# Patient Record
Sex: MALE | Race: WHITE | URBAN - METROPOLITAN AREA
[De-identification: names, ages, dates, MRNs, and addresses within clinical notes are randomized per-mention and may not be internally consistent; named-entity substitution may affect disease eponyms.]

---

## 2021-03-22 ENCOUNTER — OFFICE VISIT (OUTPATIENT)
Dept: URBAN - METROPOLITAN AREA CLINIC 13 | Facility: CLINIC | Age: 71
End: 2021-03-22
Payer: COMMERCIAL

## 2021-03-22 DIAGNOSIS — H35.81 RETINAL EDEMA: ICD-10-CM

## 2021-03-22 PROCEDURE — 99214 OFFICE O/P EST MOD 30 MIN: CPT | Performed by: OPHTHALMOLOGY

## 2021-03-22 PROCEDURE — 92134 CPTRZ OPH DX IMG PST SGM RTA: CPT | Performed by: OPHTHALMOLOGY

## 2021-03-22 ASSESSMENT — INTRAOCULAR PRESSURE
OD: 17
OS: 14

## 2021-03-22 NOTE — IMPRESSION/PLAN
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 OD.
OCT OU: resolved  OD, No SRF/ OS 
- h/o CME s/p CE/PCIOL ~2008 -> s/p PSTK x 1 -> resolution
- new episode of CME OD 7/31/2018 --> recurrence 8/26/19 Plan: H/o recurrence of iritis and CME off drops. Now remains resolved on daily PF.

RBAC's of complete taper vs CSM d/w patient. He elects to cont maintenance drops IOP OD a bit higher than OS, however remains WNL. Will monitor. May need IOP gtt in future. 

3-6m OCT OU

## 2021-03-22 NOTE — IMPRESSION/PLAN
Impression: Unspecified retinal break, left eye: H33.302.
s/p laser retinopexy Plan: No RD/RT. Stable-monitor RD warning instructions

## 2021-10-25 ENCOUNTER — OFFICE VISIT (OUTPATIENT)
Dept: URBAN - METROPOLITAN AREA CLINIC 13 | Facility: CLINIC | Age: 71
End: 2021-10-25
Payer: COMMERCIAL

## 2021-10-25 DIAGNOSIS — H33.302 UNSPECIFIED RETINAL BREAK, LEFT EYE: ICD-10-CM

## 2021-10-25 DIAGNOSIS — H30.91 UNSPECIFIED CHORIORETINAL INFLAMMATION, RIGHT EYE: Primary | ICD-10-CM

## 2021-10-25 PROCEDURE — 99214 OFFICE O/P EST MOD 30 MIN: CPT | Performed by: OPHTHALMOLOGY

## 2021-10-25 PROCEDURE — 92134 CPTRZ OPH DX IMG PST SGM RTA: CPT | Performed by: OPHTHALMOLOGY

## 2021-10-25 ASSESSMENT — INTRAOCULAR PRESSURE
OS: 8
OD: 14

## 2021-10-25 NOTE — IMPRESSION/PLAN
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 OD.
OCT OU: resolved IRF OD, No SRF/IRF OS  / 276 
- h/o CME s/p CE/PCIOL ~2008 -> s/p PSTK x 1 -> resolution
- new episode of CME OD 7/31/2018 --> recurrence 8/26/19 Plan: H/o recurrence of iritis and CME off drops. Now remains resolved on daily PF.

RBAC's of complete taper vs CSM d/w patient. He elects to cont maintenance drops IOP OD a bit higher than OS, however continues to remain WNL. Will monitor. May need IOP gtt in future. 

6m OCT OU

## 2022-05-23 ENCOUNTER — OFFICE VISIT (OUTPATIENT)
Dept: URBAN - METROPOLITAN AREA CLINIC 13 | Facility: CLINIC | Age: 72
End: 2022-05-23
Payer: COMMERCIAL

## 2022-05-23 DIAGNOSIS — H33.302 UNSPECIFIED RETINAL BREAK, LEFT EYE: ICD-10-CM

## 2022-05-23 DIAGNOSIS — H35.411 LATTICE DEGENERATION OF RETINA, RIGHT EYE: ICD-10-CM

## 2022-05-23 DIAGNOSIS — H35.81 RETINAL EDEMA: ICD-10-CM

## 2022-05-23 DIAGNOSIS — H30.91 UNSPECIFIED CHORIORETINAL INFLAMMATION, RIGHT EYE: Primary | ICD-10-CM

## 2022-05-23 PROCEDURE — 92134 CPTRZ OPH DX IMG PST SGM RTA: CPT | Performed by: OPHTHALMOLOGY

## 2022-05-23 PROCEDURE — 99214 OFFICE O/P EST MOD 30 MIN: CPT | Performed by: OPHTHALMOLOGY

## 2022-05-23 ASSESSMENT — INTRAOCULAR PRESSURE
OD: 16
OS: 10

## 2022-05-23 NOTE — IMPRESSION/PLAN
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 OD.
OCT OU: resolved IRF OD, No SRF/IRF OS  / 268
- h/o CME s/p CE/PCIOL ~2008 -> s/p PSTK x 1 -> resolution
- new episode of CME OD 7/31/2018 --> recurrence 8/26/19 Plan: H/o recurrence of iritis and CME off drops. Now remains resolved on daily PF.

RBAC's of complete taper vs CSM d/w patient. He elects to cont maintenance drops IOP OD a bit higher than OS, however continues to remain WNL. Will monitor. May need IOP gtt in future. D/w patient 6m OCT OU

## 2023-03-07 ENCOUNTER — OFFICE VISIT (OUTPATIENT)
Dept: URBAN - METROPOLITAN AREA CLINIC 13 | Facility: CLINIC | Age: 73
End: 2023-03-07
Payer: COMMERCIAL

## 2023-03-07 DIAGNOSIS — H33.302 UNSPECIFIED RETINAL BREAK, LEFT EYE: ICD-10-CM

## 2023-03-07 DIAGNOSIS — H35.81 RETINAL EDEMA: ICD-10-CM

## 2023-03-07 DIAGNOSIS — H30.91 UNSPECIFIED CHORIORETINAL INFLAMMATION, RIGHT EYE: Primary | ICD-10-CM

## 2023-03-07 DIAGNOSIS — H35.411 LATTICE DEGENERATION OF RETINA, RIGHT EYE: ICD-10-CM

## 2023-03-07 PROCEDURE — 92134 CPTRZ OPH DX IMG PST SGM RTA: CPT | Performed by: OPHTHALMOLOGY

## 2023-03-07 PROCEDURE — 99213 OFFICE O/P EST LOW 20 MIN: CPT | Performed by: OPHTHALMOLOGY

## 2023-03-07 RX ORDER — PREDNISOLONE ACETATE 10 MG/ML
1 % SUSPENSION/ DROPS OPHTHALMIC
Qty: 5 | Refills: 3 | Status: INACTIVE
Start: 2023-03-07 | End: 2023-04-05

## 2023-03-07 ASSESSMENT — INTRAOCULAR PRESSURE
OS: 12
OD: 17

## 2023-03-07 NOTE — IMPRESSION/PLAN
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 OD.
OCT OU: no SRF/IRF OD, No SRF/IRF OS  / 272
- h/o CME s/p CE/PCIOL ~2008 -> s/p PSTK x 1 -> resolution
- new episode of CME OD 7/31/2018 --> recurrence 8/26/19 Plan: H/o recurrence of iritis and CME off drops. Now remains resolved on daily PF OD.

RBAC's of complete taper vs CSM d/w patient. He elects to cont maintenance drops IOP OD a bit higher than OS, however continues to remain WNL. Will monitor. May need IOP gtt in future. D/w patient 6m OCT OU

## 2023-04-17 ENCOUNTER — OFFICE VISIT (OUTPATIENT)
Dept: URBAN - METROPOLITAN AREA CLINIC 15 | Facility: CLINIC | Age: 73
End: 2023-04-17
Payer: COMMERCIAL

## 2023-04-17 DIAGNOSIS — H20.012 PRIMARY IRIDOCYCLITIS, LEFT EYE: Primary | ICD-10-CM

## 2023-04-17 PROCEDURE — 99203 OFFICE O/P NEW LOW 30 MIN: CPT | Performed by: OPTOMETRIST

## 2023-04-17 RX ORDER — PREDNISOLONE ACETATE 10 MG/ML
1 % SUSPENSION/ DROPS OPHTHALMIC
Qty: 5 | Refills: 3 | Status: ACTIVE
Start: 2023-04-17

## 2023-04-17 RX ORDER — ATROPINE SULFATE 10 MG/ML
1 % SOLUTION/ DROPS OPHTHALMIC
Qty: 5 | Refills: 0 | Status: ACTIVE
Start: 2023-04-17

## 2023-04-17 RX ORDER — ATROPINE SULFATE 10 MG/ML
1 % SOLUTION/ DROPS OPHTHALMIC
Qty: 5 | Refills: 0 | Status: INACTIVE
Start: 2023-04-17 | End: 2023-04-17

## 2023-04-17 NOTE — IMPRESSION/PLAN
Impression: Crohn's disease, unspecified, with other complication: M97.133. Plan: Family HX of Crohns and has been diagnosed over 10 years. Patient not sure if flare is occuring.

## 2023-04-17 NOTE — IMPRESSION/PLAN
Impression: Primary iridocyclitis, left eye: H20.012. Plan: Educated patient on exam findings and importance of initiating prompt treatment. Rx Prednisolone acetate 1% Q3hrs OS, Atropine 1% gtts BID OS. Discussed long-term treatment plan with patient.

## 2023-04-20 ENCOUNTER — OFFICE VISIT (OUTPATIENT)
Dept: URBAN - METROPOLITAN AREA CLINIC 15 | Facility: CLINIC | Age: 73
End: 2023-04-20
Payer: COMMERCIAL

## 2023-04-20 DIAGNOSIS — K50.918 CROHN'S DISEASE, UNSPECIFIED, WITH OTHER COMPLICATION: ICD-10-CM

## 2023-04-20 PROCEDURE — 99213 OFFICE O/P EST LOW 20 MIN: CPT | Performed by: OPTOMETRIST

## 2023-04-20 ASSESSMENT — INTRAOCULAR PRESSURE
OS: 17
OD: 18

## 2023-04-20 NOTE — IMPRESSION/PLAN
Impression: Primary iridocyclitis, left eye: H20.012. Plan: Improving. Prednisolone acetate 1% Q3hrs OS, D/C Atropine 1% gtts BID OS. RTC in 1 wk for follow-up.

## 2023-04-20 NOTE — IMPRESSION/PLAN
Impression: Crohn's disease, unspecified, with other complication: X44.450. Plan: Family HX of Crohns and has been diagnosed over 10 years. Patient not sure if flare is occuring.

## 2023-04-27 ENCOUNTER — OFFICE VISIT (OUTPATIENT)
Dept: URBAN - METROPOLITAN AREA CLINIC 15 | Facility: CLINIC | Age: 73
End: 2023-04-27
Payer: COMMERCIAL

## 2023-04-27 DIAGNOSIS — H20.012 PRIMARY IRIDOCYCLITIS, LEFT EYE: Primary | ICD-10-CM

## 2023-04-27 DIAGNOSIS — K50.918 CROHN'S DISEASE, UNSPECIFIED, WITH OTHER COMPLICATION: ICD-10-CM

## 2023-04-27 PROCEDURE — 99212 OFFICE O/P EST SF 10 MIN: CPT | Performed by: OPTOMETRIST

## 2023-04-27 ASSESSMENT — INTRAOCULAR PRESSURE
OD: 18
OS: 16

## 2023-04-27 NOTE — IMPRESSION/PLAN
Impression: Crohn's disease, unspecified, with other complication: Q04.641. Plan: Family HX of Crohns and has been diagnosed over 10 years.

## 2023-04-27 NOTE — IMPRESSION/PLAN
Impression: Primary iridocyclitis, left eye: H20.012. Plan: Resolved, initiate taper Prednisolone Acetate QID x 1wk, TID x 1wk, BID x 1wk, QD x 1wk OS.

## 2023-05-30 ENCOUNTER — OFFICE VISIT (OUTPATIENT)
Dept: URBAN - METROPOLITAN AREA CLINIC 15 | Facility: CLINIC | Age: 73
End: 2023-05-30
Payer: COMMERCIAL

## 2023-05-30 DIAGNOSIS — H20.022 RECURRENT ACUTE IRIDOCYCLITIS, LEFT EYE: Primary | ICD-10-CM

## 2023-05-30 PROCEDURE — 99213 OFFICE O/P EST LOW 20 MIN: CPT | Performed by: OPTOMETRIST

## 2023-05-30 ASSESSMENT — INTRAOCULAR PRESSURE
OD: 17
OS: 19

## 2023-05-30 NOTE — IMPRESSION/PLAN
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 Plan: Educated patient on exam findings and importance of initiating prompt treatment. Rx Prednisolone acetate 1% QID OS then initiate taper TID OS x1 wk, BID x 1wk and then QD x 1wk , Atropine 1% gtts BID OS. Discussed long-term treatment plan with patient. Patient reports during most recent colonoscopy there was some swelling in the ileum and polyps found not in the ileum.

## 2023-06-26 ENCOUNTER — OFFICE VISIT (OUTPATIENT)
Dept: URBAN - METROPOLITAN AREA CLINIC 15 | Facility: CLINIC | Age: 73
End: 2023-06-26
Payer: COMMERCIAL

## 2023-06-26 DIAGNOSIS — H20.022 RECURRENT ACUTE IRIDOCYCLITIS, LEFT EYE: Primary | ICD-10-CM

## 2023-06-26 PROCEDURE — 99212 OFFICE O/P EST SF 10 MIN: CPT | Performed by: OPTOMETRIST

## 2023-06-26 ASSESSMENT — INTRAOCULAR PRESSURE
OS: 16
OD: 16

## 2023-06-26 NOTE — IMPRESSION/PLAN
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 Plan: Resolved. Educated patient on exam findings and importance of initiating prompt treatment. Patient okay to have annual comprehensive exams done with Varun.

## 2024-07-23 ENCOUNTER — OFFICE VISIT (OUTPATIENT)
Dept: URBAN - METROPOLITAN AREA CLINIC 13 | Facility: CLINIC | Age: 74
End: 2024-07-23
Payer: COMMERCIAL

## 2024-07-23 DIAGNOSIS — H30.91 UNSPECIFIED CHORIORETINAL INFLAMMATION, RIGHT EYE: Primary | ICD-10-CM

## 2024-07-23 DIAGNOSIS — H35.411 LATTICE DEGENERATION OF RETINA, RIGHT EYE: ICD-10-CM

## 2024-07-23 DIAGNOSIS — H33.302 UNSPECIFIED RETINAL BREAK, LEFT EYE: ICD-10-CM

## 2024-07-23 PROCEDURE — 99214 OFFICE O/P EST MOD 30 MIN: CPT | Performed by: OPHTHALMOLOGY

## 2024-07-23 PROCEDURE — 92134 CPTRZ OPH DX IMG PST SGM RTA: CPT | Performed by: OPHTHALMOLOGY

## 2024-07-23 RX ORDER — PREDNISOLONE ACETATE 10 MG/ML
1 % SUSPENSION/ DROPS OPHTHALMIC
Qty: 5 | Refills: 2 | Status: INACTIVE
Start: 2024-07-23 | End: 2024-07-23

## 2024-07-23 RX ORDER — PREDNISOLONE ACETATE 10 MG/ML
1 % SUSPENSION/ DROPS OPHTHALMIC
Qty: 5 | Refills: 2 | Status: INACTIVE
Start: 2024-07-23 | End: 2024-10-20

## 2024-07-23 ASSESSMENT — INTRAOCULAR PRESSURE
OD: 9
OS: 7

## 2024-09-03 ENCOUNTER — OFFICE VISIT (OUTPATIENT)
Dept: URBAN - METROPOLITAN AREA CLINIC 13 | Facility: CLINIC | Age: 74
End: 2024-09-03
Payer: MEDICARE

## 2024-09-03 DIAGNOSIS — H30.91 UNSPECIFIED CHORIORETINAL INFLAMMATION, RIGHT EYE: Primary | ICD-10-CM

## 2024-09-03 DIAGNOSIS — H33.302 UNSPECIFIED RETINAL BREAK, LEFT EYE: ICD-10-CM

## 2024-09-03 DIAGNOSIS — H35.411 LATTICE DEGENERATION OF RETINA, RIGHT EYE: ICD-10-CM

## 2024-09-03 PROCEDURE — 92134 CPTRZ OPH DX IMG PST SGM RTA: CPT | Performed by: OPHTHALMOLOGY

## 2024-09-03 PROCEDURE — 99213 OFFICE O/P EST LOW 20 MIN: CPT | Performed by: OPHTHALMOLOGY

## 2024-09-03 ASSESSMENT — INTRAOCULAR PRESSURE
OD: 17
OS: 12

## 2024-12-03 ENCOUNTER — OFFICE VISIT (OUTPATIENT)
Dept: URBAN - METROPOLITAN AREA CLINIC 13 | Facility: CLINIC | Age: 74
End: 2024-12-03
Payer: MEDICARE

## 2024-12-03 DIAGNOSIS — H33.312 HORSESHOE TEAR OF RETINA WITHOUT DETACHMENT, LEFT EYE: ICD-10-CM

## 2024-12-03 DIAGNOSIS — H30.91 UNSPECIFIED CHORIORETINAL INFLAMMATION, RIGHT EYE: Primary | ICD-10-CM

## 2024-12-03 DIAGNOSIS — H33.302 UNSPECIFIED RETINAL BREAK, LEFT EYE: ICD-10-CM

## 2024-12-03 DIAGNOSIS — H35.411 LATTICE DEGENERATION OF RETINA, RIGHT EYE: ICD-10-CM

## 2024-12-03 PROCEDURE — 99213 OFFICE O/P EST LOW 20 MIN: CPT | Performed by: OPHTHALMOLOGY

## 2024-12-03 PROCEDURE — 92134 CPTRZ OPH DX IMG PST SGM RTA: CPT | Performed by: OPHTHALMOLOGY

## 2024-12-03 ASSESSMENT — INTRAOCULAR PRESSURE
OS: 10
OD: 14

## 2025-04-01 ENCOUNTER — OFFICE VISIT (OUTPATIENT)
Dept: URBAN - METROPOLITAN AREA CLINIC 13 | Facility: CLINIC | Age: 75
End: 2025-04-01
Payer: MEDICARE

## 2025-04-01 DIAGNOSIS — H33.302 UNSPECIFIED RETINAL BREAK, LEFT EYE: ICD-10-CM

## 2025-04-01 DIAGNOSIS — H30.91 UNSPECIFIED CHORIORETINAL INFLAMMATION, RIGHT EYE: Primary | ICD-10-CM

## 2025-04-01 DIAGNOSIS — H35.411 LATTICE DEGENERATION OF RETINA, RIGHT EYE: ICD-10-CM

## 2025-04-01 PROCEDURE — 92014 COMPRE OPH EXAM EST PT 1/>: CPT | Performed by: OPHTHALMOLOGY

## 2025-04-01 PROCEDURE — 92134 CPTRZ OPH DX IMG PST SGM RTA: CPT | Performed by: OPHTHALMOLOGY

## 2025-04-01 RX ORDER — PREDNISOLONE ACETATE 10 MG/ML
1 % SUSPENSION/ DROPS OPHTHALMIC
Qty: 5 | Refills: 3 | Status: ACTIVE
Start: 2025-04-01

## 2025-04-01 ASSESSMENT — INTRAOCULAR PRESSURE
OD: 13
OS: 8